# Patient Record
Sex: FEMALE | Race: WHITE | NOT HISPANIC OR LATINO | Employment: UNEMPLOYED | URBAN - METROPOLITAN AREA
[De-identification: names, ages, dates, MRNs, and addresses within clinical notes are randomized per-mention and may not be internally consistent; named-entity substitution may affect disease eponyms.]

---

## 2022-07-19 ENCOUNTER — OFFICE VISIT (OUTPATIENT)
Dept: FAMILY MEDICINE CLINIC | Facility: CLINIC | Age: 47
End: 2022-07-19

## 2022-07-19 VITALS
OXYGEN SATURATION: 97 % | WEIGHT: 174 LBS | DIASTOLIC BLOOD PRESSURE: 82 MMHG | SYSTOLIC BLOOD PRESSURE: 132 MMHG | BODY MASS INDEX: 30.83 KG/M2 | HEART RATE: 79 BPM | HEIGHT: 63 IN

## 2022-07-19 DIAGNOSIS — R09.82 PND (POST-NASAL DRIP): ICD-10-CM

## 2022-07-19 DIAGNOSIS — F17.200 SMOKER: ICD-10-CM

## 2022-07-19 DIAGNOSIS — J02.9 PHARYNGITIS, UNSPECIFIED ETIOLOGY: Primary | ICD-10-CM

## 2022-07-19 DIAGNOSIS — Z53.20 COLON CANCER SCREENING DECLINED: ICD-10-CM

## 2022-07-19 DIAGNOSIS — J04.0 LARYNGITIS: ICD-10-CM

## 2022-07-19 DIAGNOSIS — Z12.31 BREAST CANCER SCREENING BY MAMMOGRAM: ICD-10-CM

## 2022-07-19 PROCEDURE — 99204 OFFICE O/P NEW MOD 45 MIN: CPT | Performed by: NURSE PRACTITIONER

## 2022-07-19 RX ORDER — METHYLPREDNISOLONE 4 MG/1
TABLET ORAL
Qty: 1 EACH | Refills: 0 | Status: SHIPPED | OUTPATIENT
Start: 2022-07-19 | End: 2022-08-31

## 2022-07-19 NOTE — PROGRESS NOTES
Chief Complaint  Follow-up (Seen at [facility] for Bronchitis)    Subjective          Sharon Lagunas presents to [facility] for   History of Present Illness  Patient presents today to establish care.  She is following up from recent ER visit to [place] for bronchitis.  Patient states that she does not feel she had bronchitis, states she went in with a sore throat and cough, states they barely listened to her.  Patient states that she has had this sore throat for quite some time.  Pt has had sore throat for approx 1 month, states that it has improved a bit, but seems to get worse anytime she talks much.  Patient is a smoker, denies any history of acid reflux.  Patient states that when she talks very much her sore throat gets worse and her voice comes and goes.  Patient denies any frequent bouts of laryngitis.  Patient states that she is not short of breath, states they gave her an albuterol inhaler however it made her have a sore throat every time she used it so she has discontinued use.  They prescribed her a Z-Shady which she was able to take, but they also prescribed prednisone tablets, which she states she was unable to take.  Patient states that she has always had difficulty with swallowing pills, states even birth control pills are difficult for her.    Medical History  History reviewed. No pertinent past medical history.  Surgical History  Past Surgical History:   Procedure Laterality Date   • GALLBLADDER SURGERY     • TUBAL ABDOMINAL LIGATION       Social History  Social History     Socioeconomic History   • Marital status:    Tobacco Use   • Smoking status: Current Every Day Smoker     Packs/day: 0.50     Years: 29.00     Pack years: 14.50     Types: Cigarettes   • Smokeless tobacco: Never Used       Current Outpatient Medications:   •  methylPREDNISolone (MEDROL) 4 MG dose pack, Take as directed on package instructions., Disp: 1 each, Rfl: 0    Review of  "Systems     Objective     /82   Pulse 79   Ht 160 cm (63\")   Wt 78.9 kg (174 lb)   SpO2 97%   BMI 30.82 kg/m²     Body mass index is 30.82 kg/m².    Physical Exam  Vitals reviewed.   Constitutional:       Appearance: Normal appearance. She is well-developed.   HENT:      Head: Normocephalic and atraumatic.      Right Ear: External ear normal.      Left Ear: External ear normal.   Eyes:      Conjunctiva/sclera: Conjunctivae normal.      Pupils: Pupils are equal, round, and reactive to light.   Cardiovascular:      Rate and Rhythm: Normal rate and regular rhythm.      Heart sounds: No murmur heard.    No friction rub. No gallop.   Pulmonary:      Effort: Pulmonary effort is normal.      Breath sounds: Normal breath sounds. No wheezing or rhonchi.   Skin:     General: Skin is warm and dry.   Neurological:      Mental Status: She is alert and oriented to person, place, and time.      Cranial Nerves: No cranial nerve deficit.   Psychiatric:         Mood and Affect: Mood and affect normal.         Behavior: Behavior normal.         Thought Content: Thought content normal.         Judgment: Judgment normal.         Result Review :     The following data was reviewed by: MICHELLE Lara on 07/19/2022:        Data reviewed: Recent hospitalization notes Recent ER visit to [place] for cough and sore throat, chest x-ray done in ER                 Assessment:  Diagnoses and all orders for this visit:    1. Pharyngitis, unspecified etiology (Primary)  -     methylPREDNISolone (MEDROL) 4 MG dose pack; Take as directed on package instructions.  Dispense: 1 each; Refill: 0    2. Smoker  Assessment & Plan:  Encouraged smoking cessation, patient verbalized understanding and states she does intend to quit soon.  Patient is intending to donate a kidney to her brother if she is a match, she states she will have to quit smoking 1 month prior to the surgery per the guidelines, she intends to quit at that time.  States " she has decreased her amount of smoking recently down to a half a pack a day.      3. Laryngitis  Assessment & Plan:  Discussed with patient that if her sore throat and loss of voice does not improve after treatment with the Medrol Dosepak she needs to follow-up so we can refer her to ENT for further evaluation given her history of smoking    Orders:  -     methylPREDNISolone (MEDROL) 4 MG dose pack; Take as directed on package instructions.  Dispense: 1 each; Refill: 0    4. Breast cancer screening by mammogram  -     Mammo Screening Digital Tomosynthesis Bilateral With CAD; Future    5. Colon cancer screening declined  Comments:  Patient declines to schedule colonoscopy at present, states she may reconsider at a later date    6. PND (post-nasal drip)  -     methylPREDNISolone (MEDROL) 4 MG dose pack; Take as directed on package instructions.  Dispense: 1 each; Refill: 0              Follow Up     Return if symptoms worsen or fail to improve.    Patient was given instructions and counseling regarding her condition or for health maintenance advice. Please see specific information pulled into the AVS if appropriate.     Trinidad Salmeron, APRN  07/19/2022

## 2022-07-19 NOTE — ASSESSMENT & PLAN NOTE
Discussed with patient that if her sore throat and loss of voice does not improve after treatment with the Medrol Dosepak she needs to follow-up so we can refer her to ENT for further evaluation given her history of smoking

## 2022-07-19 NOTE — ASSESSMENT & PLAN NOTE
Encouraged smoking cessation, patient verbalized understanding and states she does intend to quit soon.  Patient is intending to donate a kidney to her brother if she is a match, she states she will have to quit smoking 1 month prior to the surgery per the guidelines, she intends to quit at that time.  States she has decreased her amount of smoking recently down to a half a pack a day.

## 2022-08-31 ENCOUNTER — TELEPHONE (OUTPATIENT)
Dept: FAMILY MEDICINE CLINIC | Facility: CLINIC | Age: 47
End: 2022-08-31

## 2022-08-31 ENCOUNTER — OFFICE VISIT (OUTPATIENT)
Dept: FAMILY MEDICINE CLINIC | Facility: CLINIC | Age: 47
End: 2022-08-31

## 2022-08-31 VITALS
DIASTOLIC BLOOD PRESSURE: 82 MMHG | BODY MASS INDEX: 32 KG/M2 | OXYGEN SATURATION: 98 % | SYSTOLIC BLOOD PRESSURE: 134 MMHG | HEART RATE: 80 BPM | HEIGHT: 63 IN | WEIGHT: 180.6 LBS

## 2022-08-31 DIAGNOSIS — F17.200 SMOKER: Primary | ICD-10-CM

## 2022-08-31 PROCEDURE — 99213 OFFICE O/P EST LOW 20 MIN: CPT | Performed by: NURSE PRACTITIONER

## 2022-08-31 NOTE — PROGRESS NOTES
"Chief Complaint  Kidney donation (Patient is trying to donate a kidney to her brother and needs paperwork checked and signed )    SUBJECTIVE  Sharon Lagunas presents to [facility]   Patient presents today to discuss potential kidney donation to her brother.  Patient is wanting her paperwork faxed.  Patient admits that she is still smoking, states that she is unsure if she will have to quit for the surgery, states that she is also not sure if she is a match yet or not.  Patient would like to have Pap smear done here if the transplant team is okay with this, she wishes to wait to speak with her coordinator prior to scheduling.    History of Present Illness  History reviewed. No pertinent past medical history.   Family History   Problem Relation Age of Onset   • Cancer Father    • Hypertension Father    • Diabetes Father    • Diabetes Brother    • Hypertension Brother    • Kidney disease Brother       Past Surgical History:   Procedure Laterality Date   • GALLBLADDER SURGERY     • TUBAL ABDOMINAL LIGATION        No current outpatient medications on file.    OBJECTIVE  Vital Signs:   /82   Pulse 80   Ht 160 cm (63\")   Wt 81.9 kg (180 lb 9.6 oz)   LMP 08/16/2022 (Approximate)   SpO2 98%   BMI 31.99 kg/m²    Estimated body mass index is 31.99 kg/m² as calculated from the following:    Height as of this encounter: 160 cm (63\").    Weight as of this encounter: 81.9 kg (180 lb 9.6 oz).     Wt Readings from Last 3 Encounters:   08/31/22 81.9 kg (180 lb 9.6 oz)   07/19/22 78.9 kg (174 lb)     BP Readings from Last 3 Encounters:   08/31/22 134/82   07/19/22 132/82       Physical Exam  Vitals reviewed.   Constitutional:       Appearance: Normal appearance. She is well-developed.   HENT:      Head: Normocephalic and atraumatic.      Right Ear: External ear normal.      Left Ear: External ear normal.   Eyes:      Conjunctiva/sclera: Conjunctivae normal.      Pupils: Pupils are equal, " round, and reactive to light.   Cardiovascular:      Rate and Rhythm: Normal rate and regular rhythm.      Heart sounds: No murmur heard.    No friction rub. No gallop.   Pulmonary:      Effort: Pulmonary effort is normal.      Breath sounds: Normal breath sounds. No wheezing or rhonchi.   Abdominal:      Tenderness: There is no abdominal tenderness.   Skin:     General: Skin is warm and dry.   Neurological:      Mental Status: She is alert and oriented to person, place, and time.      Cranial Nerves: No cranial nerve deficit.   Psychiatric:         Mood and Affect: Mood and affect normal.         Behavior: Behavior normal.         Thought Content: Thought content normal.         Judgment: Judgment normal.          Result Review              XR Chest 1 View    Result Date: 6/29/2022  Normal x-ray examination of the chest. Electronically Signed: Leoncio Dasilva MD 2022/06/29 at 12:04 CDT Reading Location ID and State: [id number] / [place] Tel [phone], Service support  [phone], Fax [phone]       The above data has been reviewed by MICHELLE Lara 08/31/2022 07:48 EDT.          Patient Care Team:  Trinidad Salmeron APRN as PCP - General (Nurse Practitioner)            ASSESSMENT & PLAN    Diagnoses and all orders for this visit:    1. Smoker (Primary)  Assessment & Plan:  Pt states she is smoking about a 1/2ppd currently, discussed that I highly recommend she quit prior to transplant surgery if she goes through with it, is planning to try to quit prior to the surgery, still does not know if she is a match, declines smoking cessation medication at this moment         Tobacco Use: High Risk   • Smoking Tobacco Use: Current Every Day Smoker   • Smokeless Tobacco Use: Never Used     We will fax the patient's paperwork that she has brought as requested, however it does not appear that it is anything that requires signature from primary care.    Follow Up     Return if symptoms worsen or fail to  improve.        Patient was given instructions and counseling regarding her condition or for health maintenance advice. Please see specific information pulled into the AVS if appropriate.   I have reviewed information obtained and documented by others and I have confirmed the accuracy of this documented note.    MICHELLE Lara

## 2022-08-31 NOTE — TELEPHONE ENCOUNTER
Caller: Sharon Lagunas    Relationship: Self    Best call back number: [phone]     What specialty or service is being requested: COLONOSCOPY

## 2022-08-31 NOTE — ASSESSMENT & PLAN NOTE
Pt states she is smoking about a 1/2ppd currently, discussed that I highly recommend she quit prior to transplant surgery if she goes through with it, is planning to try to quit prior to the surgery, still does not know if she is a match, declines smoking cessation medication at this moment

## 2022-09-19 ENCOUNTER — OFFICE VISIT (OUTPATIENT)
Dept: FAMILY MEDICINE CLINIC | Facility: CLINIC | Age: 47
End: 2022-09-19

## 2022-09-19 ENCOUNTER — LAB (OUTPATIENT)
Dept: LAB | Facility: HOSPITAL | Age: 47
End: 2022-09-19

## 2022-09-19 VITALS
HEART RATE: 75 BPM | OXYGEN SATURATION: 97 % | BODY MASS INDEX: 31.93 KG/M2 | HEIGHT: 63 IN | WEIGHT: 180.2 LBS | DIASTOLIC BLOOD PRESSURE: 90 MMHG | SYSTOLIC BLOOD PRESSURE: 140 MMHG

## 2022-09-19 DIAGNOSIS — Z13.220 SCREENING FOR LIPID DISORDERS: ICD-10-CM

## 2022-09-19 DIAGNOSIS — Z13.29 SCREENING FOR THYROID DISORDER: Primary | ICD-10-CM

## 2022-09-19 DIAGNOSIS — Z12.11 COLON CANCER SCREENING: ICD-10-CM

## 2022-09-19 DIAGNOSIS — R79.89 ABNORMAL TSH: ICD-10-CM

## 2022-09-19 DIAGNOSIS — Z13.29 SCREENING FOR THYROID DISORDER: ICD-10-CM

## 2022-09-19 DIAGNOSIS — Z01.89 ROUTINE LAB DRAW: ICD-10-CM

## 2022-09-19 DIAGNOSIS — R73.09 ELEVATED GLUCOSE: ICD-10-CM

## 2022-09-19 DIAGNOSIS — Z01.419 WELL WOMAN EXAM WITH ROUTINE GYNECOLOGICAL EXAM: ICD-10-CM

## 2022-09-19 LAB
ALBUMIN SERPL-MCNC: 4.4 G/DL (ref 3.5–5.2)
ALBUMIN/GLOB SERPL: 1.4 G/DL
ALP SERPL-CCNC: 87 U/L (ref 39–117)
ALT SERPL W P-5'-P-CCNC: 19 U/L (ref 1–33)
ANION GAP SERPL CALCULATED.3IONS-SCNC: 10.3 MMOL/L (ref 5–15)
AST SERPL-CCNC: 22 U/L (ref 1–32)
BASOPHILS # BLD AUTO: 0.06 10*3/MM3 (ref 0–0.2)
BASOPHILS NFR BLD AUTO: 0.9 % (ref 0–1.5)
BILIRUB SERPL-MCNC: <0.2 MG/DL (ref 0–1.2)
BUN SERPL-MCNC: 13 MG/DL (ref 6–20)
BUN/CREAT SERPL: 14.8 (ref 7–25)
CALCIUM SPEC-SCNC: 9.6 MG/DL (ref 8.6–10.5)
CHLORIDE SERPL-SCNC: 105 MMOL/L (ref 98–107)
CHOLEST SERPL-MCNC: 191 MG/DL (ref 0–200)
CO2 SERPL-SCNC: 24.7 MMOL/L (ref 22–29)
CREAT SERPL-MCNC: 0.88 MG/DL (ref 0.57–1)
DEPRECATED RDW RBC AUTO: 42.5 FL (ref 37–54)
EGFRCR SERPLBLD CKD-EPI 2021: 82.7 ML/MIN/1.73
EOSINOPHIL # BLD AUTO: 0.06 10*3/MM3 (ref 0–0.4)
EOSINOPHIL NFR BLD AUTO: 0.9 % (ref 0.3–6.2)
ERYTHROCYTE [DISTWIDTH] IN BLOOD BY AUTOMATED COUNT: 13.6 % (ref 12.3–15.4)
GLOBULIN UR ELPH-MCNC: 3.1 GM/DL
GLUCOSE SERPL-MCNC: 105 MG/DL (ref 65–99)
HCT VFR BLD AUTO: 43 % (ref 34–46.6)
HDLC SERPL-MCNC: 42 MG/DL (ref 40–60)
HGB BLD-MCNC: 14.4 G/DL (ref 12–15.9)
IMM GRANULOCYTES # BLD AUTO: 0.03 10*3/MM3 (ref 0–0.05)
IMM GRANULOCYTES NFR BLD AUTO: 0.4 % (ref 0–0.5)
LDLC SERPL CALC-MCNC: 127 MG/DL (ref 0–100)
LDLC/HDLC SERPL: 2.97 {RATIO}
LYMPHOCYTES # BLD AUTO: 2.59 10*3/MM3 (ref 0.7–3.1)
LYMPHOCYTES NFR BLD AUTO: 38.4 % (ref 19.6–45.3)
MCH RBC QN AUTO: 28.7 PG (ref 26.6–33)
MCHC RBC AUTO-ENTMCNC: 33.5 G/DL (ref 31.5–35.7)
MCV RBC AUTO: 85.7 FL (ref 79–97)
MONOCYTES # BLD AUTO: 0.41 10*3/MM3 (ref 0.1–0.9)
MONOCYTES NFR BLD AUTO: 6.1 % (ref 5–12)
NEUTROPHILS NFR BLD AUTO: 3.6 10*3/MM3 (ref 1.7–7)
NEUTROPHILS NFR BLD AUTO: 53.3 % (ref 42.7–76)
NRBC BLD AUTO-RTO: 0 /100 WBC (ref 0–0.2)
PLATELET # BLD AUTO: 417 10*3/MM3 (ref 140–450)
PMV BLD AUTO: 9.9 FL (ref 6–12)
POTASSIUM SERPL-SCNC: 5 MMOL/L (ref 3.5–5.2)
PROT SERPL-MCNC: 7.5 G/DL (ref 6–8.5)
RBC # BLD AUTO: 5.02 10*6/MM3 (ref 3.77–5.28)
SODIUM SERPL-SCNC: 140 MMOL/L (ref 136–145)
TRIGL SERPL-MCNC: 121 MG/DL (ref 0–150)
TSH SERPL DL<=0.05 MIU/L-ACNC: 5.44 UIU/ML (ref 0.27–4.2)
VLDLC SERPL-MCNC: 22 MG/DL (ref 5–40)
WBC NRBC COR # BLD: 6.75 10*3/MM3 (ref 3.4–10.8)

## 2022-09-19 PROCEDURE — 80061 LIPID PANEL: CPT

## 2022-09-19 PROCEDURE — 36415 COLL VENOUS BLD VENIPUNCTURE: CPT

## 2022-09-19 PROCEDURE — 99396 PREV VISIT EST AGE 40-64: CPT | Performed by: NURSE PRACTITIONER

## 2022-09-19 PROCEDURE — 87624 HPV HI-RISK TYP POOLED RSLT: CPT | Performed by: NURSE PRACTITIONER

## 2022-09-19 PROCEDURE — 83036 HEMOGLOBIN GLYCOSYLATED A1C: CPT

## 2022-09-19 PROCEDURE — 84439 ASSAY OF FREE THYROXINE: CPT

## 2022-09-19 PROCEDURE — 80050 GENERAL HEALTH PANEL: CPT

## 2022-09-19 PROCEDURE — G0123 SCREEN CERV/VAG THIN LAYER: HCPCS | Performed by: NURSE PRACTITIONER

## 2022-09-19 NOTE — PROGRESS NOTES
"Subjective   History of Present Illness    Sharon Lagunas is a 47 y.o. female who presents for annual exam.    Obstetric History:  OB History    No obstetric history on file.        Menstrual History:  Menarche Age: 11 years  Patient's last menstrual period was 09/11/2022 (approximate).  Period Pattern: (!) Irregular  Menstrual Flow: Heavy  Menstrual Control: Maxi pad, Tampon  Dysmenorrhea: None    Sexual History:         No results found for: HPVAPTIMA    Current contraception: tubal  History of abnormal Pap smear: YES  PJ exposure in utero: NO  Received Gardasil immunization: NO  Perform regular self breast exam: YES  Family history of uterine or ovarian cancer: NO  Family History of cervical cancer: YES COUSIN  Family History of colon cancer/colon polyps: YES  Regular self breast exam: YES  History of abnormal mammogram: NO  Family history of breast cancer: NO  History of abnormal lipids: NO    The following portions of the patient's history were reviewed and updated as appropriate: allergies, current medications, past family history, past medical history, past social history, past surgical history and problem list.    Review of Systems    A comprehensive review of systems was negative.     Objective   Physical Exam    /90 (BP Location: Left arm)   Pulse 75   Ht 160 cm (63\")   Wt 81.7 kg (180 lb 3.2 oz)   LMP 09/11/2022 (Approximate)   SpO2 97%   BMI 31.92 kg/m²     General:   alert, appears stated age and cooperative   Heart: regular rate and rhythm, S1, S2 normal, no murmur, click, rub or gallop   Lungs: clear to auscultation bilaterally   Abdomen: soft, non-tender, without masses or organomegaly   Breast: inspection negative, no nipple discharge or bleeding, no masses or nodularity palpable   Vulva: normal   Vagina: normal mucosa, normal discharge   Cervix: no cervical motion tenderness and no lesions   Uterus: non-tender   Adnexa: no mass, fullness, tenderness     Assessment & Plan   Diagnoses and " all orders for this visit:    1. Screening for thyroid disorder (Primary)  -     TSH; Future    2. Routine lab draw  -     Comprehensive Metabolic Panel; Future  -     CBC & Differential; Future    3. Screening for lipid disorders  -     Lipid Panel; Future    4. Colon cancer screening  -     Ambulatory Referral For Screening Colonoscopy    5. Well woman exam with routine gynecological exam  -     IgP, Aptima HPV; Future  -     IgP, Aptima HPV      The patient is advised to quit smoking, attempt to lose weight and return for routine annual checkups.    All questions answered.  Await pap smear results.  Blood tests: CBC with diff, Comprehensive metabolic panel, Total cholesterol and TSH.  Thin prep Pap smear.

## 2022-09-20 DIAGNOSIS — R79.89 ABNORMAL TSH: ICD-10-CM

## 2022-09-20 DIAGNOSIS — R73.09 ELEVATED GLUCOSE: Primary | ICD-10-CM

## 2022-09-20 LAB
HBA1C MFR BLD: 5.5 % (ref 4.8–5.6)
T4 FREE SERPL-MCNC: 1.02 NG/DL (ref 0.93–1.7)

## 2022-09-21 DIAGNOSIS — E03.9 HYPOTHYROIDISM, UNSPECIFIED TYPE: Primary | ICD-10-CM

## 2022-09-21 RX ORDER — LEVOTHYROXINE SODIUM 0.03 MG/1
25 TABLET ORAL DAILY
Qty: 30 TABLET | Refills: 2 | Status: ON HOLD | OUTPATIENT
Start: 2022-09-21 | End: 2022-11-14 | Stop reason: SDUPTHER

## 2022-09-22 ENCOUNTER — HOSPITAL ENCOUNTER (OUTPATIENT)
Dept: MAMMOGRAPHY | Facility: HOSPITAL | Age: 47
Discharge: HOME | End: 2022-09-22
Admitting: NURSE PRACTITIONER

## 2022-09-22 DIAGNOSIS — Z12.31 BREAST CANCER SCREENING BY MAMMOGRAM: ICD-10-CM

## 2022-09-22 LAB
CYTOLOGIST CVX/VAG CYTO: NORMAL
CYTOLOGY CVX/VAG DOC CYTO: NORMAL
CYTOLOGY CVX/VAG DOC THIN PREP: NORMAL
DX ICD CODE: NORMAL
HIV 1 & 2 AB SER-IMP: NORMAL
HPV I/H RISK 4 DNA CVX QL PROBE+SIG AMP: NEGATIVE
OTHER STN SPEC: NORMAL
STAT OF ADQ CVX/VAG CYTO-IMP: NORMAL

## 2022-09-22 PROCEDURE — 77063 BREAST TOMOSYNTHESIS BI: CPT

## 2022-09-22 PROCEDURE — 77067 SCR MAMMO BI INCL CAD: CPT

## 2022-09-30 ENCOUNTER — TELEPHONE (OUTPATIENT)
Dept: FAMILY MEDICINE CLINIC | Facility: CLINIC | Age: 47
End: 2022-09-30

## 2022-09-30 NOTE — TELEPHONE ENCOUNTER
Caller: Sharon Lagunas    Relationship: Self    Best call back number: [phone]    What specialty or service is being requested: COLONOSCOPY    What is the provider, practice or medical service name: [facility]    What is the office location: [facility] [place], [place]     What is the office phone number: [phone]    Any additional details:   PATIENT CALLED TO LET US KNOW THAT SHE FOUND A GASTROENTEROLOGY DOCTOR THAT COULD POSSIBLY DO COLONOSCOPY FOR HER SOONER THAN 10/20/2022 WITH Cathy GARZON. SHE JUST NEEDS TO KNOW IF EXISTING REFERRAL CAN BE USED FOR THAT, OR DOES IT NEED TO BE A NEW ONE SINCE IT IS ACROSS STATE LINES. SHE IS REQUESTING CALL BACK.

## 2022-10-20 ENCOUNTER — PREP FOR SURGERY (OUTPATIENT)
Dept: OTHER | Facility: HOSPITAL | Age: 47
End: 2022-10-20

## 2022-10-20 ENCOUNTER — OFFICE VISIT (OUTPATIENT)
Dept: SURGERY | Facility: CLINIC | Age: 47
End: 2022-10-20

## 2022-10-20 VITALS — HEART RATE: 87 BPM | BODY MASS INDEX: 32.07 KG/M2 | WEIGHT: 181 LBS | HEIGHT: 63 IN

## 2022-10-20 DIAGNOSIS — Z12.11 SCREENING FOR COLON CANCER: Primary | ICD-10-CM

## 2022-10-20 PROCEDURE — S0285 CNSLT BEFORE SCREEN COLONOSC: HCPCS | Performed by: NURSE PRACTITIONER

## 2022-10-20 RX ORDER — POLYETHYLENE GLYCOL 3350 17 G/17G
POWDER, FOR SOLUTION ORAL
Qty: 238 PACKET | Refills: 0 | Status: SHIPPED | OUTPATIENT
Start: 2022-10-20

## 2022-10-20 NOTE — PROGRESS NOTES
Chief Complaint: Colonoscopy (consult)    Subjective    Colonoscopy consultation         History of Present Illness  Sharon Lagunas is a 47 y.o. female presents to [facility] for colonoscopy consultation.    Patient presents today on referral from Nadya Mancini for colonoscopy consultation.    Patient reports that she is trying to donate a kidney to her brother, and needs a colonoscopy for clearance for donation.    She denies any abdominal pain, change in bowel habit, or rectal bleeding.    Denies any family history of colorectal cancer.    No previous colonoscopy    Objective     Past Medical History:   Diagnosis Date   • Hypothyroid        Past Surgical History:   Procedure Laterality Date   • GALLBLADDER SURGERY     • TUBAL ABDOMINAL LIGATION         Outpatient Medications Marked as Taking for the 10/20/22 encounter (Office Visit) with Cathy Mahmood, MICHELLE   Medication Sig Dispense Refill   • levothyroxine (SYNTHROID, LEVOTHROID) 25 MCG tablet Take 1 tablet by mouth Daily. 30 tablet 2       Allergies   Allergen Reactions   • Penicillins Hives and Nausea And Vomiting        Family History   Problem Relation Age of Onset   • Cancer Father    • Hypertension Father    • Diabetes Father    • Diabetes Brother    • Hypertension Brother    • Kidney disease Brother        Social History     Socioeconomic History   • Marital status:    Tobacco Use   • Smoking status: Every Day     Packs/day: 0.50     Years: 29.00     Pack years: 14.50     Types: Cigarettes   • Smokeless tobacco: Never   Vaping Use   • Vaping Use: Never used   Substance and Sexual Activity   • Alcohol use: Yes     Comment: rare   • Drug use: Never   • Sexual activity: Defer       Review of Systems   Constitutional: Negative for chills and fever.   Gastrointestinal: Negative for abdominal distention, abdominal pain, anal bleeding, blood in stool, constipation, diarrhea and rectal pain.        Vital Signs:   Pulse  "87   Ht 160 cm (63\")   Wt 82.1 kg (181 lb)   BMI 32.06 kg/m²      Physical Exam  Vitals and nursing note reviewed.   Constitutional:       General: She is not in acute distress.     Appearance: She is obese.   HENT:      Head: Normocephalic.   Cardiovascular:      Rate and Rhythm: Normal rate.   Pulmonary:      Effort: Pulmonary effort is normal.      Breath sounds: No stridor. No wheezing.   Abdominal:      Palpations: Abdomen is soft.      Tenderness: There is no guarding.   Musculoskeletal:         General: No deformity. Normal range of motion.   Skin:     General: Skin is warm and dry.      Coloration: Skin is not jaundiced.   Neurological:      General: No focal deficit present.      Mental Status: She is alert and oriented to person, place, and time.   Psychiatric:         Mood and Affect: Mood normal.         Thought Content: Thought content normal.          Result Review :          []  Laboratory  []  Radiology  []  Pathology  []  Microbiology  []  EKG/Telemetry   []  Cardiology/Vascular   [x]  Old records  Today I have reviewed Nadya Mancini's previous office note.     Assessment and Plan    Diagnoses and all orders for this visit:    1. Screening for colon cancer (Primary)    Other orders  -     polyethylene glycol (MIRALAX) 17 g packet; Take as directed.  Instructions given in office.  Dispense: 238 g bottle  Dispense: 238 packet; Refill: 0        Follow Up   Return for Scheduled colonoscopy with Dr. Jasmine on 11/14/2022 at [facility].     Hospital arrival time:12:30.     Possible risks/complications, benefits, and alternatives to surgical or invasive procedure have been explained to patient and/or legal guardian.     Patient has been evaluated and can tolerate anesthesia and/or sedation. Risks, benefits, and alternatives to anesthesia and sedation have been explained to patient and/or legal guardian.  Patient verbalizes understanding and is will proceed with above plan. "     Patient was given instructions and counseling regarding her condition or for health maintenance advice. Please see specific information pulled into the AVS if appropriate.     The office note was dictated with the help of the dragon dictation system.

## 2022-11-08 ENCOUNTER — TELEPHONE (OUTPATIENT)
Dept: FAMILY MEDICINE CLINIC | Facility: CLINIC | Age: 47
End: 2022-11-08

## 2022-11-14 ENCOUNTER — HOSPITAL ENCOUNTER (OUTPATIENT)
Facility: HOSPITAL | Age: 47
Setting detail: HOSPITAL OUTPATIENT SURGERY
Discharge: HOME | End: 2022-11-14
Attending: SURGERY | Admitting: SURGERY

## 2022-11-14 ENCOUNTER — LAB (OUTPATIENT)
Dept: LAB | Facility: HOSPITAL | Age: 47
End: 2022-11-14

## 2022-11-14 ENCOUNTER — ANESTHESIA EVENT (OUTPATIENT)
Dept: GASTROENTEROLOGY | Facility: HOSPITAL | Age: 47
End: 2022-11-14

## 2022-11-14 ENCOUNTER — ANESTHESIA (OUTPATIENT)
Dept: GASTROENTEROLOGY | Facility: HOSPITAL | Age: 47
End: 2022-11-14

## 2022-11-14 VITALS
BODY MASS INDEX: 30.78 KG/M2 | HEART RATE: 72 BPM | WEIGHT: 173.72 LBS | OXYGEN SATURATION: 98 % | SYSTOLIC BLOOD PRESSURE: 137 MMHG | RESPIRATION RATE: 16 BRPM | DIASTOLIC BLOOD PRESSURE: 83 MMHG | TEMPERATURE: 98.2 F | HEIGHT: 63 IN

## 2022-11-14 DIAGNOSIS — E03.9 HYPOTHYROIDISM, UNSPECIFIED TYPE: ICD-10-CM

## 2022-11-14 DIAGNOSIS — E03.9 HYPOTHYROIDISM, UNSPECIFIED TYPE: Primary | ICD-10-CM

## 2022-11-14 LAB
T4 FREE SERPL-MCNC: 1.07 NG/DL (ref 0.93–1.7)
TSH SERPL DL<=0.05 MIU/L-ACNC: 8.43 UIU/ML (ref 0.27–4.2)

## 2022-11-14 PROCEDURE — 25010000002 PROPOFOL 10 MG/ML EMULSION: Performed by: NURSE ANESTHETIST, CERTIFIED REGISTERED

## 2022-11-14 PROCEDURE — 36415 COLL VENOUS BLD VENIPUNCTURE: CPT

## 2022-11-14 PROCEDURE — 84439 ASSAY OF FREE THYROXINE: CPT

## 2022-11-14 PROCEDURE — 84443 ASSAY THYROID STIM HORMONE: CPT

## 2022-11-14 RX ORDER — LEVOTHYROXINE SODIUM 0.05 MG/1
50 TABLET ORAL DAILY
Qty: 30 TABLET | Refills: 2 | Status: SHIPPED | OUTPATIENT
Start: 2022-11-14 | End: 2023-01-09 | Stop reason: DRUGHIGH

## 2022-11-14 RX ORDER — SODIUM CHLORIDE, SODIUM LACTATE, POTASSIUM CHLORIDE, CALCIUM CHLORIDE 600; 310; 30; 20 MG/100ML; MG/100ML; MG/100ML; MG/100ML
30 INJECTION, SOLUTION INTRAVENOUS CONTINUOUS
Status: DISCONTINUED | OUTPATIENT
Start: 2022-11-14 | End: 2022-11-14 | Stop reason: HOSPADM

## 2022-11-14 RX ORDER — LIDOCAINE HYDROCHLORIDE 20 MG/ML
INJECTION, SOLUTION EPIDURAL; INFILTRATION; INTRACAUDAL; PERINEURAL AS NEEDED
Status: DISCONTINUED | OUTPATIENT
Start: 2022-11-14 | End: 2022-11-14 | Stop reason: SURG

## 2022-11-14 RX ADMIN — LIDOCAINE HYDROCHLORIDE 30 MG: 20 INJECTION, SOLUTION EPIDURAL; INFILTRATION; INTRACAUDAL; PERINEURAL at 14:31

## 2022-11-14 RX ADMIN — SODIUM CHLORIDE, POTASSIUM CHLORIDE, SODIUM LACTATE AND CALCIUM CHLORIDE 30 ML/HR: 600; 310; 30; 20 INJECTION, SOLUTION INTRAVENOUS at 13:17

## 2022-11-14 RX ADMIN — PROPOFOL 120 MCG/KG/MIN: 10 INJECTION, EMULSION INTRAVENOUS at 14:31

## 2022-11-14 NOTE — ANESTHESIA POSTPROCEDURE EVALUATION
Patient: Sharon Lagunas    Procedure Summary     Date: 11/14/22 Room / Location: [facility] 1 / [facility]    Anesthesia Start: 1429 Anesthesia Stop: 1451    Procedure: COLONOSCOPY Diagnosis:       Screening for colon cancer      (Screening for colon cancer [Z12.11])    Surgeons: Darrin Jasmine MD Provider: Alexx Srinivasan MD    Anesthesia Type: general ASA Status: 2          Anesthesia Type: general    Vitals  Vitals Value Taken Time   /87 11/14/22 1501   Temp 36.6 °C (97.8 °F) 11/14/22 1450   Pulse 72 11/14/22 1505   Resp 24 11/14/22 1500   SpO2 98 % 11/14/22 1505           Post Anesthesia Care and Evaluation    Patient location during evaluation: bedside  Patient participation: complete - patient participated  Level of consciousness: awake  Pain management: adequate    Airway patency: patent  Anesthetic complications: No anesthetic complications  PONV Status: none  Cardiovascular status: acceptable and stable  Respiratory status: acceptable  Hydration status: acceptable    Comments: An Anesthesiologist personally participated in the most demanding procedures (including induction and emergence if applicable) in the anesthesia plan, monitored the course of anesthesia administration at frequent intervals and remained physically present and available for immediate diagnosis and treatment of emergencies.

## 2022-11-14 NOTE — ANESTHESIA PREPROCEDURE EVALUATION
Anesthesia Evaluation     Patient summary reviewed and Nursing notes reviewed   no history of anesthetic complications:  NPO Solid Status: > 8 hours  NPO Liquid Status: > 2 hours           Airway   Mallampati: II  TM distance: >3 FB  Neck ROM: full  No difficulty expected  Dental    (+) edentulous    Pulmonary - negative pulmonary ROS and normal exam    breath sounds clear to auscultation  Cardiovascular - negative cardio ROS and normal exam  Exercise tolerance: good (4-7 METS)    Rhythm: regular  Rate: normal        Neuro/Psych- negative ROS  GI/Hepatic/Renal/Endo    (+)   thyroid problem hypothyroidism    Musculoskeletal (-) negative ROS    Abdominal    Substance History - negative use     OB/GYN negative ob/gyn ROS         Other - negative ROS       ROS/Med Hx Other: PAT Nursing Notes unavailable.                   Anesthesia Plan    ASA 2     general     (Total IV Anesthesia    Patient understands anesthesia not responsible for dental damage.  )  intravenous induction     Anesthetic plan, risks, benefits, and alternatives have been provided, discussed and informed consent has been obtained with: patient.    Plan discussed with CRNA.        CODE STATUS:

## 2023-01-05 ENCOUNTER — TELEPHONE (OUTPATIENT)
Dept: FAMILY MEDICINE CLINIC | Facility: CLINIC | Age: 48
End: 2023-01-05
Payer: COMMERCIAL

## 2023-01-09 ENCOUNTER — LAB (OUTPATIENT)
Dept: LAB | Facility: HOSPITAL | Age: 48
End: 2023-01-09
Payer: COMMERCIAL

## 2023-01-09 DIAGNOSIS — E03.9 HYPOTHYROIDISM, UNSPECIFIED TYPE: ICD-10-CM

## 2023-01-09 DIAGNOSIS — E03.9 HYPOTHYROIDISM, UNSPECIFIED TYPE: Primary | ICD-10-CM

## 2023-01-09 LAB
T4 FREE SERPL-MCNC: 0.96 NG/DL (ref 0.93–1.7)
TSH SERPL DL<=0.05 MIU/L-ACNC: 6.82 UIU/ML (ref 0.27–4.2)

## 2023-01-09 PROCEDURE — 36415 COLL VENOUS BLD VENIPUNCTURE: CPT

## 2023-01-09 PROCEDURE — 84443 ASSAY THYROID STIM HORMONE: CPT

## 2023-01-09 PROCEDURE — 84439 ASSAY OF FREE THYROXINE: CPT

## 2023-01-09 RX ORDER — LEVOTHYROXINE SODIUM 0.07 MG/1
75 TABLET ORAL DAILY
Qty: 30 TABLET | Refills: 2 | Status: SHIPPED | OUTPATIENT
Start: 2023-01-09

## 2023-02-08 ENCOUNTER — LAB (OUTPATIENT)
Dept: LAB | Facility: HOSPITAL | Age: 48
End: 2023-02-08
Payer: COMMERCIAL

## 2023-02-08 DIAGNOSIS — E03.9 HYPOTHYROIDISM, UNSPECIFIED TYPE: ICD-10-CM

## 2023-02-08 LAB
T4 FREE SERPL-MCNC: 1.09 NG/DL (ref 0.93–1.7)
TSH SERPL DL<=0.05 MIU/L-ACNC: 8.52 UIU/ML (ref 0.27–4.2)

## 2023-02-08 PROCEDURE — 84439 ASSAY OF FREE THYROXINE: CPT

## 2023-02-08 PROCEDURE — 36415 COLL VENOUS BLD VENIPUNCTURE: CPT

## 2023-02-08 PROCEDURE — 84443 ASSAY THYROID STIM HORMONE: CPT

## 2023-07-31 ENCOUNTER — TELEPHONE (OUTPATIENT)
Dept: FAMILY MEDICINE CLINIC | Facility: CLINIC | Age: 48
End: 2023-07-31
Payer: COMMERCIAL

## 2023-08-14 ENCOUNTER — TELEPHONE (OUTPATIENT)
Dept: FAMILY MEDICINE CLINIC | Facility: CLINIC | Age: 48
End: 2023-08-14
Payer: COMMERCIAL

## 2023-08-14 NOTE — TELEPHONE ENCOUNTER
Caller: Bebo Sharon    Relationship: Self    Best call back number: [phone]     What is the best time to reach you: ANY    Who are you requesting to speak with (clinical staff, provider,  specific staff member): CLINICAL    What was the call regarding: PATIENT CALLED TO REPORT SHE DOES NOT HAVE HER BLOOD PRESSURE READINGS BACK YET AND WOULD LIKE TO KNOW IF SHE STILL NEEDS HER APPOINTMENT ON 8.16.2023. PLEASE ADVISE.

## 2023-08-15 ENCOUNTER — LAB (OUTPATIENT)
Dept: LAB | Facility: HOSPITAL | Age: 48
End: 2023-08-15
Payer: COMMERCIAL

## 2023-08-15 ENCOUNTER — TRANSCRIBE ORDERS (OUTPATIENT)
Dept: ADMINISTRATIVE | Facility: HOSPITAL | Age: 48
End: 2023-08-15
Payer: COMMERCIAL

## 2023-08-15 DIAGNOSIS — Z52.4 KIDNEY DONOR: Primary | ICD-10-CM

## 2023-08-15 DIAGNOSIS — Z52.4 KIDNEY DONOR: ICD-10-CM

## 2023-08-15 LAB
BACTERIA UR QL AUTO: ABNORMAL /HPF
BILIRUB UR QL STRIP: NEGATIVE
CLARITY UR: CLEAR
COLOR UR: YELLOW
GLUCOSE UR STRIP-MCNC: NEGATIVE MG/DL
HGB UR QL STRIP.AUTO: ABNORMAL
HYALINE CASTS UR QL AUTO: ABNORMAL /LPF
KETONES UR QL STRIP: NEGATIVE
LEUKOCYTE ESTERASE UR QL STRIP.AUTO: ABNORMAL
NITRITE UR QL STRIP: NEGATIVE
PH UR STRIP.AUTO: 6.5 [PH] (ref 5–8)
PROT UR QL STRIP: NEGATIVE
RBC # UR STRIP: ABNORMAL /HPF
REF LAB TEST METHOD: ABNORMAL
SP GR UR STRIP: 1.01 (ref 1–1.03)
SQUAMOUS #/AREA URNS HPF: ABNORMAL /HPF
UROBILINOGEN UR QL STRIP: ABNORMAL
WBC # UR STRIP: ABNORMAL /HPF

## 2023-08-15 PROCEDURE — 81001 URINALYSIS AUTO W/SCOPE: CPT

## 2023-08-18 ENCOUNTER — TELEPHONE (OUTPATIENT)
Dept: FAMILY MEDICINE CLINIC | Facility: CLINIC | Age: 48
End: 2023-08-18

## 2023-08-18 NOTE — TELEPHONE ENCOUNTER
Caller: Sharon Lagunas    Relationship: Self    Best call back number: [phone]    What is the best time to reach you: ANY    Who are you requesting to speak with (clinical staff, provider,  specific staff member): CLINICAL    What was the call regarding: PATIENT HAD URINALYSIS WITH BLOOD IN URINE SHE WOULD LIKE TO KNOW IS MICHELLE BARNES WANTS TO SCHEDULE A FOLLOW UP APPOINTMENT

## 2023-08-21 NOTE — TELEPHONE ENCOUNTER
Not sure of the circumstances surrounding this urine, but if patient would like to follow-up, please schedule an appointment

## 2023-08-23 ENCOUNTER — TRANSCRIBE ORDERS (OUTPATIENT)
Dept: ADMINISTRATIVE | Facility: HOSPITAL | Age: 48
End: 2023-08-23
Payer: COMMERCIAL

## 2023-08-23 ENCOUNTER — OFFICE VISIT (OUTPATIENT)
Dept: FAMILY MEDICINE CLINIC | Facility: CLINIC | Age: 48
End: 2023-08-23
Payer: COMMERCIAL

## 2023-08-23 ENCOUNTER — LAB (OUTPATIENT)
Dept: LAB | Facility: HOSPITAL | Age: 48
End: 2023-08-23
Payer: COMMERCIAL

## 2023-08-23 VITALS
HEART RATE: 68 BPM | WEIGHT: 183 LBS | DIASTOLIC BLOOD PRESSURE: 78 MMHG | OXYGEN SATURATION: 97 % | SYSTOLIC BLOOD PRESSURE: 124 MMHG | HEIGHT: 63 IN | BODY MASS INDEX: 32.43 KG/M2

## 2023-08-23 DIAGNOSIS — R31.9 HEMATURIA, UNSPECIFIED TYPE: Primary | ICD-10-CM

## 2023-08-23 DIAGNOSIS — E03.9 HYPOTHYROIDISM, UNSPECIFIED TYPE: Chronic | ICD-10-CM

## 2023-08-23 DIAGNOSIS — Z52.4 KIDNEY DONOR: Primary | ICD-10-CM

## 2023-08-23 DIAGNOSIS — Z52.4 KIDNEY DONOR: ICD-10-CM

## 2023-08-23 LAB
BACTERIA UR QL AUTO: ABNORMAL /HPF
BILIRUB UR QL STRIP: NEGATIVE
CLARITY UR: CLEAR
COLOR UR: YELLOW
GLUCOSE UR STRIP-MCNC: NEGATIVE MG/DL
HGB UR QL STRIP.AUTO: NEGATIVE
HYALINE CASTS UR QL AUTO: ABNORMAL /LPF
KETONES UR QL STRIP: NEGATIVE
LEUKOCYTE ESTERASE UR QL STRIP.AUTO: NEGATIVE
NITRITE UR QL STRIP: NEGATIVE
PH UR STRIP.AUTO: 7 [PH] (ref 5–8)
PROT UR QL STRIP: ABNORMAL
RBC # UR STRIP: ABNORMAL /HPF
REF LAB TEST METHOD: ABNORMAL
SP GR UR STRIP: 1.02 (ref 1–1.03)
SQUAMOUS #/AREA URNS HPF: ABNORMAL /HPF
UROBILINOGEN UR QL STRIP: ABNORMAL
WBC # UR STRIP: ABNORMAL /HPF

## 2023-08-23 PROCEDURE — 99213 OFFICE O/P EST LOW 20 MIN: CPT | Performed by: NURSE PRACTITIONER

## 2023-08-23 PROCEDURE — 81001 URINALYSIS AUTO W/SCOPE: CPT

## 2023-08-23 RX ORDER — LEVOTHYROXINE SODIUM 0.1 MG/1
100 TABLET ORAL DAILY
Qty: 90 TABLET | Refills: 1 | Status: SHIPPED | OUTPATIENT
Start: 2023-08-23

## 2023-08-23 NOTE — ASSESSMENT & PLAN NOTE
Last labs showing replacement, we will send in increased dose of levothyroxine 100 mcg today, order placed for patient to recheck labs in 6 weeks

## 2023-08-28 ENCOUNTER — TELEPHONE (OUTPATIENT)
Dept: FAMILY MEDICINE CLINIC | Facility: CLINIC | Age: 48
End: 2023-08-28
Payer: COMMERCIAL

## 2023-08-28 DIAGNOSIS — Z12.31 SCREENING MAMMOGRAM FOR BREAST CANCER: Primary | ICD-10-CM

## 2023-08-28 NOTE — TELEPHONE ENCOUNTER
Caller: Sharon Lagunas    Relationship: Self    Best call back number: [phone]    What orders are you requesting (i.e. lab or imaging): MAMMOGRAM    In what timeframe would the patient need to come in: ON OR BEFORE 09/25/2023    Where will you receive your lab/imaging services: [facility]    Additional notes: PATIENT IS NEEDING THIS ON OR BEFORE 09/25/2023

## 2023-10-12 ENCOUNTER — HOSPITAL ENCOUNTER (OUTPATIENT)
Dept: MAMMOGRAPHY | Facility: HOSPITAL | Age: 48
Discharge: HOME | End: 2023-10-12
Payer: COMMERCIAL

## 2023-10-12 ENCOUNTER — LAB (OUTPATIENT)
Dept: LAB | Facility: HOSPITAL | Age: 48
End: 2023-10-12
Payer: COMMERCIAL

## 2023-10-12 DIAGNOSIS — E03.9 HYPOTHYROIDISM, UNSPECIFIED TYPE: Chronic | ICD-10-CM

## 2023-10-12 DIAGNOSIS — Z12.31 SCREENING MAMMOGRAM FOR BREAST CANCER: ICD-10-CM

## 2023-10-12 DIAGNOSIS — R92.8 ABNORMAL SCREENING MAMMOGRAM: Primary | ICD-10-CM

## 2023-10-12 LAB
T4 FREE SERPL-MCNC: 0.97 NG/DL (ref 0.93–1.7)
TSH SERPL DL<=0.05 MIU/L-ACNC: 10.1 UIU/ML (ref 0.27–4.2)

## 2023-10-12 PROCEDURE — 77063 BREAST TOMOSYNTHESIS BI: CPT

## 2023-10-12 PROCEDURE — 84439 ASSAY OF FREE THYROXINE: CPT

## 2023-10-12 PROCEDURE — 36415 COLL VENOUS BLD VENIPUNCTURE: CPT

## 2023-10-12 PROCEDURE — 84443 ASSAY THYROID STIM HORMONE: CPT

## 2023-10-12 PROCEDURE — 77067 SCR MAMMO BI INCL CAD: CPT

## 2023-10-13 ENCOUNTER — HOSPITAL ENCOUNTER (OUTPATIENT)
Dept: MAMMOGRAPHY | Facility: HOSPITAL | Age: 48
Discharge: HOME | End: 2023-10-13
Payer: COMMERCIAL

## 2023-10-13 ENCOUNTER — HOSPITAL ENCOUNTER (OUTPATIENT)
Dept: ULTRASOUND IMAGING | Facility: HOSPITAL | Age: 48
Discharge: HOME | End: 2023-10-13
Payer: COMMERCIAL

## 2023-10-13 ENCOUNTER — APPOINTMENT (OUTPATIENT)
Dept: ULTRASOUND IMAGING | Facility: HOSPITAL | Age: 48
End: 2023-10-13
Payer: COMMERCIAL

## 2023-10-13 DIAGNOSIS — R92.8 ABNORMAL SCREENING MAMMOGRAM: ICD-10-CM

## 2023-10-13 PROCEDURE — G0279 TOMOSYNTHESIS, MAMMO: HCPCS

## 2023-10-13 PROCEDURE — 76642 ULTRASOUND BREAST LIMITED: CPT

## 2023-10-13 PROCEDURE — 77065 DX MAMMO INCL CAD UNI: CPT

## 2023-10-16 ENCOUNTER — TELEPHONE (OUTPATIENT)
Dept: FAMILY MEDICINE CLINIC | Facility: CLINIC | Age: 48
End: 2023-10-16
Payer: COMMERCIAL

## 2023-10-16 DIAGNOSIS — E03.9 HYPOTHYROIDISM, UNSPECIFIED TYPE: Chronic | ICD-10-CM

## 2023-10-16 RX ORDER — LEVOTHYROXINE SODIUM 88 UG/1
88 TABLET ORAL DAILY
Qty: 30 TABLET | Refills: 1 | Status: SHIPPED | OUTPATIENT
Start: 2023-10-16

## 2023-10-16 NOTE — TELEPHONE ENCOUNTER
PT WANTED TO MAKE YOU AWARE AS AN FYI THAT SHE IS HAVING SURGERY TOMORROW SHE IS DONATING A KIDNEY TO HER BROTHER.

## 2023-10-19 ENCOUNTER — READMISSION MANAGEMENT (OUTPATIENT)
Dept: CALL CENTER | Facility: HOSPITAL | Age: 48
End: 2023-10-19
Payer: COMMERCIAL

## 2023-10-19 NOTE — OUTREACH NOTE
Prep Survey      Flowsheet Row Responses   [facility] facility patient discharged from? Non-BH   Is LACE score < 7 ? Non-BH Discharge   Eligibility [facility]   Date of Admission 10/17/23   Date of Discharge 10/18/23   Discharge Disposition Home or Self Care   Discharge diagnosis ROBOTIC ASSISTED DONOR NEPHRECTOMY   Does the patient have one of the following disease processes/diagnoses(primary or secondary)? General Surgery   Prep survey completed? Yes            Merary Junior Registered Nurse

## 2023-10-20 ENCOUNTER — TRANSITIONAL CARE MANAGEMENT TELEPHONE ENCOUNTER (OUTPATIENT)
Dept: CALL CENTER | Facility: HOSPITAL | Age: 48
End: 2023-10-20
Payer: COMMERCIAL

## 2023-10-20 NOTE — OUTREACH NOTE
Call Center TCM Note      Flowsheet Row Responses   [facility] patient discharged from? Non-  [[facility]]   Does the patient have one of the following disease processes/diagnoses(primary or secondary)? Other   TCM attempt successful? Yes   Call start time 1111   Call end time 1114   Discharge diagnosis ROBOTIC ASSISTED DONOR NEPHRECTOMY   Meds reviewed with patient/caregiver? Yes   Is the patient having any side effects they believe may be caused by any medication additions or changes? No   Does the patient have all medications ordered at discharge? Yes   Is the patient taking all medications as directed (includes completed medication regime)? Yes   Comments HOSP DC FU appt 10/31/23 930 am.   Does the patient have an appointment with their PCP within 7-14 days of discharge? Yes   Has home health visited the patient within 72 hours of discharge? N/A   Psychosocial issues? No   Did the patient receive a copy of their discharge instructions? Yes   Nursing interventions Reviewed instructions with patient   What is the patient's perception of their health status since discharge? Improving   Is the patient/caregiver able to teach back signs and symptoms related to disease process for when to call PCP? Yes   Is the patient/caregiver able to teach back signs and symptoms related to disease process for when to call 911? Yes   Is the patient/caregiver able to teach back the hierarchy of who to call/visit for symptoms/problems? PCP, Specialist, Home health nurse, Urgent Care, ED, 911 Yes   TCM call completed? Yes   Wrap up additional comments Pt reports she is doing ok. Pt did have BM this am. Reviewed restrictions and s/s of infection to monitor for.   Call end time 1114            Margaret Quintanilla RN    10/20/2023, 11:15 EDT

## 2024-09-12 ENCOUNTER — OFFICE VISIT (OUTPATIENT)
Dept: FAMILY MEDICINE CLINIC | Facility: CLINIC | Age: 49
End: 2024-09-12
Payer: COMMERCIAL

## 2024-09-12 VITALS
WEIGHT: 180 LBS | BODY MASS INDEX: 31.89 KG/M2 | SYSTOLIC BLOOD PRESSURE: 126 MMHG | TEMPERATURE: 97.9 F | HEART RATE: 91 BPM | DIASTOLIC BLOOD PRESSURE: 66 MMHG | OXYGEN SATURATION: 94 % | HEIGHT: 63 IN

## 2024-09-12 DIAGNOSIS — M54.50 ACUTE BILATERAL LOW BACK PAIN WITHOUT SCIATICA: ICD-10-CM

## 2024-09-12 DIAGNOSIS — R51.9 ACUTE NONINTRACTABLE HEADACHE, UNSPECIFIED HEADACHE TYPE: ICD-10-CM

## 2024-09-12 DIAGNOSIS — U07.1 COVID-19: Primary | ICD-10-CM

## 2024-09-12 DIAGNOSIS — E03.9 HYPOTHYROIDISM, UNSPECIFIED TYPE: ICD-10-CM

## 2024-09-12 DIAGNOSIS — Z13.220 SCREENING FOR LIPID DISORDERS: ICD-10-CM

## 2024-09-12 DIAGNOSIS — R09.81 CONGESTION OF NASAL SINUS: ICD-10-CM

## 2024-09-12 DIAGNOSIS — Z13.1 SCREENING FOR DIABETES MELLITUS: ICD-10-CM

## 2024-09-12 LAB
EXPIRATION DATE: ABNORMAL
FLUAV AG UPPER RESP QL IA.RAPID: NOT DETECTED
FLUBV AG UPPER RESP QL IA.RAPID: NOT DETECTED
INTERNAL CONTROL: ABNORMAL
Lab: ABNORMAL
SARS-COV-2 AG UPPER RESP QL IA.RAPID: DETECTED

## 2024-09-12 RX ORDER — KETOROLAC TROMETHAMINE 30 MG/ML
30 INJECTION, SOLUTION INTRAMUSCULAR; INTRAVENOUS EVERY 6 HOURS PRN
Status: SHIPPED | OUTPATIENT
Start: 2024-09-12 | End: 2024-09-17

## 2024-09-12 RX ADMIN — KETOROLAC TROMETHAMINE 30 MG: 30 INJECTION, SOLUTION INTRAMUSCULAR; INTRAVENOUS at 10:16

## 2024-09-12 NOTE — LETTER
September 12, 2024     Patient: Sharon Lagunas   YOB: 1975   Date of Visit: 9/12/2024       To Whom It May Concern:    It is my medical opinion that Sharon Lagunas may return to work on 9/16/24. She is excused from 9/11/24-9/16/24.          Sincerely,        MICHELLE Rodriguez    CC: No Recipients

## 2024-09-12 NOTE — PROGRESS NOTES
Chief Complaint   Patient presents with    Headache    Back Pain    Fever     Pt has had sx since Monday night. Pt has been taking tylenol for relief.         Subjective     Sharon Lagunas  has a past medical history of Hypothyroid.    JOHNNIE Herrera is a 49-year-old female who is a patient of my colleague Trinidad Salmeron but today I am seeing her for a sick visit.    She presents with headache, back pain, and fever since Monday night. She has been taking tylenol for relief. Patient is covid positive in office today. Patient states her headache pain is a 10/10. Will give Toradol in office today for headache relief.     Hypothyroidism -patient asked if I could refill her Synthroid at 50 mcg.  Told her that she has not had a TSH drawn in almost a year and last TSH was 10.  We would need to redraw TSH before I could restart her Synthroid.    Allergies   Allergen Reactions    Penicillins Hives and Nausea And Vomiting         Current Outpatient Medications:     levothyroxine (Synthroid) 88 MCG tablet, Take 1 tablet by mouth Daily. (Patient not taking: Reported on 9/12/2024), Disp: 30 tablet, Rfl: 1    Nirmatrelvir & Ritonavir, 300mg/100mg, (PAXLOVID), Take 3 tablets by mouth 2 (Two) Times a Day for 5 days., Disp: 30 tablet, Rfl: 0    polyethylene glycol (MIRALAX) 17 g packet, Take as directed.  Instructions given in office.  Dispense: 238 g bottle (Patient not taking: Reported on 11/14/2022), Disp: 238 packet, Rfl: 0    Patient Active Problem List   Diagnosis    Smoker    Colon cancer screening declined    Laryngitis    Screening for colon cancer    Hypothyroidism        Past Surgical History:   Procedure Laterality Date    CHOLECYSTECTOMY      COLONOSCOPY N/A 11/14/2022    Procedure: COLONOSCOPY;  Surgeon: Darrin Jasmine MD;  Location: [facility];  Service: General;  Laterality: N/A;  NORMAL    TUBAL ABDOMINAL LIGATION         Social History     Socioeconomic History    Marital status:    Tobacco Use  "   Smoking status: Every Day     Current packs/day: 0.50     Average packs/day: 0.5 packs/day for 29.0 years (14.5 ttl pk-yrs)     Types: Cigarettes    Smokeless tobacco: Never   Vaping Use    Vaping status: Never Used   Substance and Sexual Activity    Alcohol use: Yes     Comment: rare    Drug use: Never    Sexual activity: Defer       Family History   Problem Relation Age of Onset    Cancer Father     Hypertension Father     Diabetes Father     Diabetes Brother     Hypertension Brother     Kidney disease Brother     Malig Hyperthermia Neg Hx        Family history, surgical history, past medical history, Allergies and med's reviewed with patient today and updated in [employer] EMR.     ROS:  Review of Systems   HENT:  Positive for congestion.    Respiratory: Negative.     Cardiovascular: Negative.    Gastrointestinal: Negative.    Musculoskeletal:  Positive for back pain.   Neurological:  Positive for headache.       OBJECTIVE:  Vitals:    09/12/24 0815   BP: 126/66   Pulse: 91   Temp: 97.9 °F (36.6 °C)   SpO2: 94%   Weight: 81.6 kg (180 lb)   Height: 160 cm (63\")     Body mass index is 31.89 kg/m².  No LMP recorded. (Menstrual status: Tubal ligation).    Physical Exam  Cardiovascular:      Rate and Rhythm: Normal rate and regular rhythm.      Pulses: Normal pulses.      Heart sounds: Normal heart sounds.   Pulmonary:      Effort: Pulmonary effort is normal.      Breath sounds: Normal breath sounds.   Neurological:      General: No focal deficit present.      Mental Status: She is oriented to person, place, and time.         BMI is >= 30 and <35. (Class 1 Obesity). The following options were offered after discussion;: exercise counseling/recommendations and nutrition counseling/recommendations      Health Maintenance Due   Topic Date Due    Pneumococcal Vaccine 0-64 (1 of 2 - PCV) Never done    TDAP/TD VACCINES (1 - Tdap) Never done    ANNUAL PHYSICAL  Never done    BMI FOLLOWUP  10/20/2023    COVID-19 Vaccine (1 - " 2023-24 season) Never done    INFLUENZA VACCINE  08/01/2024        Office Visit on 09/12/2024   Component Date Value Ref Range Status    SARS Antigen 09/12/2024 Detected (A)  Not Detected, Presumptive Negative Final    Influenza A Antigen WENCESLAO 09/12/2024 Not Detected  Not Detected Final    Influenza B Antigen WENCESLAO 09/12/2024 Not Detected  Not Detected Final    Internal Control 09/12/2024 Passed  Passed Final    Lot Number 09/12/2024 [id number]   Final    Expiration Date 09/12/2024 10/23/2025   Final     Your COVID-19 test result is positive.  You need to quarantine yourself until 3 things have happened:     1 at least 5 days of passenger symptoms first appeared  2. AND you have had no fever for at least 24 hours   3. AND other symptoms have improved (coughing, shortness of breath, etc.     If you develop emergency warning signs for COVID-19, get attention immediately.  Emergency warning signs include:  Severe trouble breathing  Persistent pain or pressure in the chest  If confusion or inability to wake  Bluish lips or face  *This list is not all inclusive    The CDC has guidance on COVID and other details.    ASSESSMENT/ PLAN:    Diagnoses and all orders for this visit:    1. COVID-19 (Primary)  -     Nirmatrelvir & Ritonavir, 300mg/100mg, (PAXLOVID); Take 3 tablets by mouth 2 (Two) Times a Day for 5 days.  Dispense: 30 tablet; Refill: 0  -     CBC w AUTO Differential; Future  -     Comprehensive metabolic panel; Future    2. Congestion of nasal sinus  -     POCT SARS-CoV-2 Antigen WENCESLAO + Flu    3. Acute nonintractable headache, unspecified headache type    4. Acute bilateral low back pain without sciatica    5. Hypothyroidism, unspecified type  -     TSH Rfx On Abnormal To Free T4    6. Screening for diabetes mellitus  -     Hemoglobin A1c; Future    7. Screening for lipid disorders  -     Lipid panel; Future        Orders Placed Today:     New Medications Ordered This Visit   Medications    Nirmatrelvir & Ritonavir,  300mg/100mg, (PAXLOVID)     Sig: Take 3 tablets by mouth 2 (Two) Times a Day for 5 days.     Dispense:  30 tablet     Refill:  0     If unable to break a box, update sig with remaining and discarded doses and dispense full box.     Order Specific Question:   I have reviewed the patient's medication list prior to prescribing Paxlovid due to the risk of serious adverse reactions secondary to drug interactions. I will consult with pharmacy, if needed     Answer:   Patient's medication list reviewed        Management Plan:     An After Visit Summary was printed and given to the patient at discharge.    Follow-up: Return in 4 weeks (on 10/10/2024) for Next scheduled follow up.    Ruth uSggs, MICHELLE 9/12/2024 08:59 EDT  This note was electronically signed.

## 2024-09-18 ENCOUNTER — LAB (OUTPATIENT)
Dept: LAB | Facility: HOSPITAL | Age: 49
End: 2024-09-18
Payer: COMMERCIAL

## 2024-09-18 DIAGNOSIS — U07.1 COVID-19: ICD-10-CM

## 2024-09-18 DIAGNOSIS — Z13.1 SCREENING FOR DIABETES MELLITUS: ICD-10-CM

## 2024-09-18 DIAGNOSIS — Z13.220 SCREENING FOR LIPID DISORDERS: ICD-10-CM

## 2024-09-18 LAB
ALBUMIN SERPL-MCNC: 4.4 G/DL (ref 3.5–5.2)
ALBUMIN/GLOB SERPL: 1.4 G/DL
ALP SERPL-CCNC: 126 U/L (ref 39–117)
ALT SERPL W P-5'-P-CCNC: 44 U/L (ref 1–33)
ANION GAP SERPL CALCULATED.3IONS-SCNC: 8 MMOL/L (ref 5–15)
AST SERPL-CCNC: 33 U/L (ref 1–32)
BASOPHILS # BLD AUTO: 0.04 10*3/MM3 (ref 0–0.2)
BASOPHILS NFR BLD AUTO: 0.5 % (ref 0–1.5)
BILIRUB SERPL-MCNC: 0.3 MG/DL (ref 0–1.2)
BUN SERPL-MCNC: 14 MG/DL (ref 6–20)
BUN/CREAT SERPL: 12.8 (ref 7–25)
CALCIUM SPEC-SCNC: 10.2 MG/DL (ref 8.6–10.5)
CHLORIDE SERPL-SCNC: 104 MMOL/L (ref 98–107)
CHOLEST SERPL-MCNC: 200 MG/DL (ref 0–200)
CO2 SERPL-SCNC: 27 MMOL/L (ref 22–29)
CREAT SERPL-MCNC: 1.09 MG/DL (ref 0.57–1)
DEPRECATED RDW RBC AUTO: 38.5 FL (ref 37–54)
EGFRCR SERPLBLD CKD-EPI 2021: 63.2 ML/MIN/1.73
EOSINOPHIL # BLD AUTO: 0.06 10*3/MM3 (ref 0–0.4)
EOSINOPHIL NFR BLD AUTO: 0.8 % (ref 0.3–6.2)
ERYTHROCYTE [DISTWIDTH] IN BLOOD BY AUTOMATED COUNT: 12.3 % (ref 12.3–15.4)
GLOBULIN UR ELPH-MCNC: 3.2 GM/DL
GLUCOSE SERPL-MCNC: 96 MG/DL (ref 65–99)
HBA1C MFR BLD: 5.7 % (ref 4.8–5.6)
HCT VFR BLD AUTO: 47 % (ref 34–46.6)
HDLC SERPL-MCNC: 31 MG/DL (ref 40–60)
HGB BLD-MCNC: 15.7 G/DL (ref 12–15.9)
IMM GRANULOCYTES # BLD AUTO: 0.03 10*3/MM3 (ref 0–0.05)
IMM GRANULOCYTES NFR BLD AUTO: 0.4 % (ref 0–0.5)
LDLC SERPL CALC-MCNC: 137 MG/DL (ref 0–100)
LDLC/HDLC SERPL: 4.33 {RATIO}
LYMPHOCYTES # BLD AUTO: 2.48 10*3/MM3 (ref 0.7–3.1)
LYMPHOCYTES NFR BLD AUTO: 32.4 % (ref 19.6–45.3)
MCH RBC QN AUTO: 29 PG (ref 26.6–33)
MCHC RBC AUTO-ENTMCNC: 33.4 G/DL (ref 31.5–35.7)
MCV RBC AUTO: 86.9 FL (ref 79–97)
MONOCYTES # BLD AUTO: 0.4 10*3/MM3 (ref 0.1–0.9)
MONOCYTES NFR BLD AUTO: 5.2 % (ref 5–12)
NEUTROPHILS NFR BLD AUTO: 4.64 10*3/MM3 (ref 1.7–7)
NEUTROPHILS NFR BLD AUTO: 60.7 % (ref 42.7–76)
NRBC BLD AUTO-RTO: 0 /100 WBC (ref 0–0.2)
PLATELET # BLD AUTO: 407 10*3/MM3 (ref 140–450)
PMV BLD AUTO: 10.6 FL (ref 6–12)
POTASSIUM SERPL-SCNC: 5.1 MMOL/L (ref 3.5–5.2)
PROT SERPL-MCNC: 7.6 G/DL (ref 6–8.5)
RBC # BLD AUTO: 5.41 10*6/MM3 (ref 3.77–5.28)
SODIUM SERPL-SCNC: 139 MMOL/L (ref 136–145)
T4 FREE SERPL-MCNC: 1.04 NG/DL (ref 0.92–1.68)
TRIGL SERPL-MCNC: 174 MG/DL (ref 0–150)
TSH SERPL DL<=0.05 MIU/L-ACNC: 6.3 UIU/ML (ref 0.27–4.2)
VLDLC SERPL-MCNC: 32 MG/DL (ref 5–40)
WBC NRBC COR # BLD AUTO: 7.65 10*3/MM3 (ref 3.4–10.8)

## 2024-09-18 PROCEDURE — 80061 LIPID PANEL: CPT

## 2024-09-18 PROCEDURE — 83036 HEMOGLOBIN GLYCOSYLATED A1C: CPT

## 2024-09-18 PROCEDURE — 36415 COLL VENOUS BLD VENIPUNCTURE: CPT

## 2024-09-18 PROCEDURE — 84439 ASSAY OF FREE THYROXINE: CPT

## 2024-09-18 PROCEDURE — 80050 GENERAL HEALTH PANEL: CPT

## 2024-09-19 ENCOUNTER — TELEPHONE (OUTPATIENT)
Dept: FAMILY MEDICINE CLINIC | Facility: CLINIC | Age: 49
End: 2024-09-19
Payer: COMMERCIAL

## 2024-09-19 DIAGNOSIS — E03.8 SUBCLINICAL HYPOTHYROIDISM: Primary | ICD-10-CM

## 2024-09-19 DIAGNOSIS — R51.9 ACUTE NONINTRACTABLE HEADACHE, UNSPECIFIED HEADACHE TYPE: Primary | ICD-10-CM

## 2024-09-19 RX ORDER — LEVOTHYROXINE SODIUM 50 UG/1
50 TABLET ORAL DAILY
Qty: 30 TABLET | Refills: 1 | Status: SHIPPED | OUTPATIENT
Start: 2024-09-19

## 2024-09-19 RX ORDER — SUMATRIPTAN 50 MG/1
TABLET, FILM COATED ORAL
Qty: 9 TABLET | Refills: 0 | Status: SHIPPED | OUTPATIENT
Start: 2024-09-19 | End: 2024-09-20

## 2024-09-20 ENCOUNTER — TELEPHONE (OUTPATIENT)
Dept: FAMILY MEDICINE CLINIC | Facility: CLINIC | Age: 49
End: 2024-09-20

## 2024-09-20 ENCOUNTER — OFFICE VISIT (OUTPATIENT)
Dept: FAMILY MEDICINE CLINIC | Facility: CLINIC | Age: 49
End: 2024-09-20
Payer: COMMERCIAL

## 2024-09-20 VITALS
DIASTOLIC BLOOD PRESSURE: 79 MMHG | HEART RATE: 73 BPM | OXYGEN SATURATION: 97 % | RESPIRATION RATE: 16 BRPM | BODY MASS INDEX: 31.78 KG/M2 | SYSTOLIC BLOOD PRESSURE: 140 MMHG | WEIGHT: 179.4 LBS

## 2024-09-20 DIAGNOSIS — R11.0 NAUSEA: ICD-10-CM

## 2024-09-20 DIAGNOSIS — R51.9 ACUTE NONINTRACTABLE HEADACHE, UNSPECIFIED HEADACHE TYPE: ICD-10-CM

## 2024-09-20 DIAGNOSIS — G44.86 CERVICOGENIC HEADACHE: Primary | ICD-10-CM

## 2024-09-20 PROCEDURE — 99213 OFFICE O/P EST LOW 20 MIN: CPT

## 2024-09-20 PROCEDURE — 96372 THER/PROPH/DIAG INJ SC/IM: CPT

## 2024-09-20 RX ORDER — ONDANSETRON 4 MG/1
4 TABLET, FILM COATED ORAL EVERY 8 HOURS PRN
Qty: 30 TABLET | Refills: 0 | Status: SHIPPED | OUTPATIENT
Start: 2024-09-20

## 2024-09-20 RX ORDER — RIMEGEPANT SULFATE 75 MG/75MG
1 TABLET, ORALLY DISINTEGRATING ORAL DAILY PRN
Qty: 8 TABLET | Refills: 2 | Status: SHIPPED | OUTPATIENT
Start: 2024-09-20 | End: 2024-09-20 | Stop reason: CLARIF

## 2024-09-20 RX ORDER — KETOROLAC TROMETHAMINE 30 MG/ML
30 INJECTION, SOLUTION INTRAMUSCULAR; INTRAVENOUS ONCE
Status: COMPLETED | OUTPATIENT
Start: 2024-09-20 | End: 2024-09-20

## 2024-09-20 RX ORDER — SUMATRIPTAN 50 MG/1
TABLET, FILM COATED ORAL
Qty: 9 TABLET | Refills: 0 | Status: SHIPPED | OUTPATIENT
Start: 2024-09-20 | End: 2024-09-20

## 2024-09-20 RX ADMIN — KETOROLAC TROMETHAMINE 30 MG: 30 INJECTION, SOLUTION INTRAMUSCULAR; INTRAVENOUS at 07:32

## 2024-10-10 ENCOUNTER — OFFICE VISIT (OUTPATIENT)
Dept: FAMILY MEDICINE CLINIC | Facility: CLINIC | Age: 49
End: 2024-10-10
Payer: COMMERCIAL

## 2024-10-10 ENCOUNTER — TELEPHONE (OUTPATIENT)
Dept: FAMILY MEDICINE CLINIC | Facility: CLINIC | Age: 49
End: 2024-10-10

## 2024-10-10 VITALS
DIASTOLIC BLOOD PRESSURE: 86 MMHG | WEIGHT: 183 LBS | SYSTOLIC BLOOD PRESSURE: 136 MMHG | BODY MASS INDEX: 32.43 KG/M2 | HEART RATE: 80 BPM | HEIGHT: 63 IN | OXYGEN SATURATION: 99 %

## 2024-10-10 DIAGNOSIS — Z12.31 ENCOUNTER FOR SCREENING MAMMOGRAM FOR MALIGNANT NEOPLASM OF BREAST: ICD-10-CM

## 2024-10-10 DIAGNOSIS — Z00.00 ANNUAL PHYSICAL EXAM: ICD-10-CM

## 2024-10-10 DIAGNOSIS — E03.9 HYPOTHYROIDISM, UNSPECIFIED TYPE: ICD-10-CM

## 2024-10-10 DIAGNOSIS — G43.009 MIGRAINE WITHOUT AURA AND WITHOUT STATUS MIGRAINOSUS, NOT INTRACTABLE: ICD-10-CM

## 2024-10-10 DIAGNOSIS — Z76.89 ESTABLISHING CARE WITH NEW DOCTOR, ENCOUNTER FOR: Primary | ICD-10-CM

## 2024-10-10 PROCEDURE — 99396 PREV VISIT EST AGE 40-64: CPT

## 2024-10-10 RX ORDER — RIMEGEPANT SULFATE 75 MG/75MG
75 TABLET, ORALLY DISINTEGRATING ORAL AS NEEDED
COMMUNITY
Start: 2024-09-20

## 2024-10-10 RX ORDER — SUMATRIPTAN 50 MG/1
50 TABLET, FILM COATED ORAL ONCE
COMMUNITY
Start: 2024-09-20

## 2024-10-10 NOTE — PROGRESS NOTES
"Chief Complaint    Annual Exam  Follow-up and Migraine (/)    Subjective          Sharon Lagunas is a 49 y.o. female who presents to [facility]    Annual Exam    Hypothyroidism - Is taking Levothyroxine 50mcg. Reports no side effects. Has missed a couple of doses of her medication and says she feels better when she does take it.    Migraine - States that the Nurtec is \"like gold\". Hasn't had a migraine since COVID last month she mainly just feels some pressure. States her symptoms are much relieved.    Donated a kidney to her brother. States she wants to stay on top of testing for diabetes and and high blood pressure.     Will place order for mammogram as she is almost due for hers.     No other concerns at this time. All care gaps addressed.      Review of Systems   Respiratory: Negative.     Cardiovascular: Negative.    Gastrointestinal: Negative.    Genitourinary: Negative.    Neurological: Negative.    Psychiatric/Behavioral: Negative.            Medical History: has a past medical history of Hypothyroid.     Surgical History: has a past surgical history that includes Tubal ligation; Cholecystectomy; and Colonoscopy (N/A, 11/14/2022).     Family History: family history includes Cancer in her father; Diabetes in her brother and father; Hypertension in her brother and father; Kidney disease in her brother.     Social History: reports that she has been smoking cigarettes. She has a 14.5 pack-year smoking history. She has never used smokeless tobacco. She reports current alcohol use. She reports that she does not use drugs.    Allergies: Penicillins      Health Maintenance Due   Topic Date Due    ANNUAL PHYSICAL  Never done            Current Outpatient Medications:     levothyroxine (Synthroid) 50 MCG tablet, Take 1 tablet by mouth Daily., Disp: 30 tablet, Rfl: 1    Nurtec 75 MG dispersible tablet, Take 1 tablet by mouth As Needed (migranes)., Disp: , Rfl:     ondansetron (ZOFRAN) 4 " "MG tablet, Take 1 tablet by mouth Every 8 (Eight) Hours As Needed for Nausea or Vomiting., Disp: 30 tablet, Rfl: 0    SUMAtriptan (IMITREX) 50 MG tablet, Take 1 tablet by mouth 1 (One) Time., Disp: , Rfl:         There is no immunization history on file for this patient.      Objective       Vitals:    10/10/24 0702   BP: 136/86   Pulse: 80   SpO2: 99%   Weight: 83 kg (183 lb)   Height: 160 cm (63\")      Body mass index is 32.42 kg/m².   Wt Readings from Last 3 Encounters:   10/10/24 83 kg (183 lb)   09/20/24 81.4 kg (179 lb 6.4 oz)   09/12/24 81.6 kg (180 lb)      BP Readings from Last 3 Encounters:   10/10/24 136/86   09/20/24 140/79   09/12/24 126/66                 Physical Exam  Cardiovascular:      Rate and Rhythm: Normal rate and regular rhythm.      Pulses: Normal pulses.      Heart sounds: Normal heart sounds.   Pulmonary:      Effort: Pulmonary effort is normal.      Breath sounds: Normal breath sounds.   Abdominal:      General: Abdomen is flat.      Palpations: Abdomen is soft.             Result Review :       Common labs          10/18/2023    06:15 9/18/2024    10:01   Common Labs   Glucose  96    Glucose 91        BUN 11     14    Creatinine 1.23     1.09    Sodium 139     139    Potassium 3.8     5.1    Chloride 104     104    Calcium 9.0     10.2    Albumin  4.4    Total Bilirubin  0.3    Alkaline Phosphatase  126    AST (SGOT)  33    ALT (SGPT)  44    WBC 20.7     7.65    Hemoglobin 12.7     15.7    Hematocrit 37.7     47.0    Platelets 431     407    Total Cholesterol  200    Triglycerides  174    HDL Cholesterol  31    LDL Cholesterol   137    Hemoglobin A1C  5.70       Details          This result is from an external source.                              Assessment and Plan          Diagnoses and all orders for this visit:    1. Establishing care with new doctor, encounter for (Primary)  Comments:  Switched providers.    2. Annual physical exam    3. Encounter for screening mammogram for " malignant neoplasm of breast  -     Mammo Screening Digital Tomosynthesis Bilateral With CAD; Future    4. Hypothyroidism, unspecified type  Comments:  Continue taking Levothyroxine. Get TSH at end of the month.    5. Migraine without aura and without status migrainosus, not intractable  Comments:  Continue taking Nurtec as needed.          Follow Up     Return in about 6 months (around 4/10/2025) for Next scheduled follow up.    Updated annual wellness visit checklist.  Immunizations discussed.  Screening discussed and/or ordered.  Recommend yearly dental and eye exams. Also discussed monitoring of blood pressure and lipids.      Patient was given instructions and counseling regarding her condition or for health maintenance advice. Please see specific information pulled into the AVS if appropriate.         MICHELLE Rodriguez

## 2024-10-10 NOTE — TELEPHONE ENCOUNTER
Messaged patient about contacting billing for her questions she had at her appointment this morning about getting labs paid for by the [facility]. Phone number given for billing.

## 2024-10-10 NOTE — PROGRESS NOTES
"Chief Complaint   Patient presents with    Follow-up    Migraine               Subjective     Sharon Lagunas  has a past medical history of Hypothyroid.    HPI    Presents today for follow up on migraine and hypothyroidism. Does well on her medication. She does miss a couple doses. States she does feel better when she takes it.     Migraine: States that the Nurtec is \"like gold\". States that she mainly gets     Inquiring about her kidney transplant. Watch for htn, dm, and creatinine.      Allergies   Allergen Reactions    Penicillins Hives and Nausea And Vomiting         Current Outpatient Medications:     levothyroxine (Synthroid) 50 MCG tablet, Take 1 tablet by mouth Daily., Disp: 30 tablet, Rfl: 1    Nurtec 75 MG dispersible tablet, Take 1 tablet by mouth As Needed (migranes)., Disp: , Rfl:     ondansetron (ZOFRAN) 4 MG tablet, Take 1 tablet by mouth Every 8 (Eight) Hours As Needed for Nausea or Vomiting., Disp: 30 tablet, Rfl: 0    SUMAtriptan (IMITREX) 50 MG tablet, Take 1 tablet by mouth 1 (One) Time., Disp: , Rfl:     Patient Active Problem List   Diagnosis    Smoker    Colon cancer screening declined    Laryngitis    Screening for colon cancer    Hypothyroidism        Past Surgical History:   Procedure Laterality Date    CHOLECYSTECTOMY      COLONOSCOPY N/A 11/14/2022    Procedure: COLONOSCOPY;  Surgeon: Darrin Jasmine MD;  Location: [facility] ENDOSCOPY;  Service: General;  Laterality: N/A;  NORMAL    TUBAL ABDOMINAL LIGATION         Social History     Socioeconomic History    Marital status:    Tobacco Use    Smoking status: Every Day     Current packs/day: 0.50     Average packs/day: 0.5 packs/day for 29.0 years (14.5 ttl pk-yrs)     Types: Cigarettes    Smokeless tobacco: Never   Vaping Use    Vaping status: Never Used   Substance and Sexual Activity    Alcohol use: Yes     Comment: rare    Drug use: Never    Sexual activity: Defer       Family History   Problem Relation Age of Onset    Cancer " "Father     Hypertension Father     Diabetes Father     Diabetes Brother     Hypertension Brother     Kidney disease Brother     Malig Hyperthermia Neg Hx        Family history, surgical history, past medical history, Allergies and med's reviewed with patient today and updated in [employer] EMR.     ROS:  Review of Systems    OBJECTIVE:  Vitals:    10/10/24 0702   BP: 136/86   Pulse: 80   SpO2: 99%   Weight: 83 kg (183 lb)   Height: 160 cm (63\")     Body mass index is 32.42 kg/m².  No LMP recorded. (Menstrual status: Tubal ligation).    Physical Exam           Health Maintenance Due   Topic Date Due    ANNUAL PHYSICAL  Never done        Lab on 09/18/2024   Component Date Value Ref Range Status    Glucose 09/18/2024 96  65 - 99 mg/dL Final    BUN 09/18/2024 14  6 - 20 mg/dL Final    Creatinine 09/18/2024 1.09 (H)  0.57 - 1.00 mg/dL Final    Sodium 09/18/2024 139  136 - 145 mmol/L Final    Potassium 09/18/2024 5.1  3.5 - 5.2 mmol/L Final    Chloride 09/18/2024 104  98 - 107 mmol/L Final    CO2 09/18/2024 27.0  22.0 - 29.0 mmol/L Final    Calcium 09/18/2024 10.2  8.6 - 10.5 mg/dL Final    Total Protein 09/18/2024 7.6  6.0 - 8.5 g/dL Final    Albumin 09/18/2024 4.4  3.5 - 5.2 g/dL Final    ALT (SGPT) 09/18/2024 44 (H)  1 - 33 U/L Final    AST (SGOT) 09/18/2024 33 (H)  1 - 32 U/L Final    Alkaline Phosphatase 09/18/2024 126 (H)  39 - 117 U/L Final    Total Bilirubin 09/18/2024 0.3  0.0 - 1.2 mg/dL Final    Globulin 09/18/2024 3.2  gm/dL Final    A/G Ratio 09/18/2024 1.4  g/dL Final    BUN/Creatinine Ratio 09/18/2024 12.8  7.0 - 25.0 Final    Anion Gap 09/18/2024 8.0  5.0 - 15.0 mmol/L Final    eGFR 09/18/2024 63.2  >60.0 mL/min/1.73 Final    Total Cholesterol 09/18/2024 200  0 - 200 mg/dL Final    Triglycerides 09/18/2024 174 (H)  0 - 150 mg/dL Final    HDL Cholesterol 09/18/2024 31 (L)  40 - 60 mg/dL Final    LDL Cholesterol  09/18/2024 137 (H)  0 - 100 mg/dL Final    VLDL Cholesterol 09/18/2024 32  5 - 40 mg/dL Final    " LDL/HDL Ratio 09/18/2024 4.33   Final    Hemoglobin A1C 09/18/2024 5.70 (H)  4.80 - 5.60 % Final    WBC 09/18/2024 7.65  3.40 - 10.80 10*3/mm3 Final    RBC 09/18/2024 5.41 (H)  3.77 - 5.28 10*6/mm3 Final    Hemoglobin 09/18/2024 15.7  12.0 - 15.9 g/dL Final    Hematocrit 09/18/2024 47.0 (H)  34.0 - 46.6 % Final    MCV 09/18/2024 86.9  79.0 - 97.0 fL Final    MCH 09/18/2024 29.0  26.6 - 33.0 pg Final    MCHC 09/18/2024 33.4  31.5 - 35.7 g/dL Final    RDW 09/18/2024 12.3  12.3 - 15.4 % Final    RDW-SD 09/18/2024 38.5  37.0 - 54.0 fl Final    MPV 09/18/2024 10.6  6.0 - 12.0 fL Final    Platelets 09/18/2024 407  140 - 450 10*3/mm3 Final    Neutrophil % 09/18/2024 60.7  42.7 - 76.0 % Final    Lymphocyte % 09/18/2024 32.4  19.6 - 45.3 % Final    Monocyte % 09/18/2024 5.2  5.0 - 12.0 % Final    Eosinophil % 09/18/2024 0.8  0.3 - 6.2 % Final    Basophil % 09/18/2024 0.5  0.0 - 1.5 % Final    Immature Grans % 09/18/2024 0.4  0.0 - 0.5 % Final    Neutrophils, Absolute 09/18/2024 4.64  1.70 - 7.00 10*3/mm3 Final    Lymphocytes, Absolute 09/18/2024 2.48  0.70 - 3.10 10*3/mm3 Final    Monocytes, Absolute 09/18/2024 0.40  0.10 - 0.90 10*3/mm3 Final    Eosinophils, Absolute 09/18/2024 0.06  0.00 - 0.40 10*3/mm3 Final    Basophils, Absolute 09/18/2024 0.04  0.00 - 0.20 10*3/mm3 Final    Immature Grans, Absolute 09/18/2024 0.03  0.00 - 0.05 10*3/mm3 Final    nRBC 09/18/2024 0.0  0.0 - 0.2 /100 WBC Final   Office Visit on 09/12/2024   Component Date Value Ref Range Status    SARS Antigen 09/12/2024 Detected (A)  Not Detected, Presumptive Negative Final    Influenza A Antigen WENCESLAO 09/12/2024 Not Detected  Not Detected Final    Influenza B Antigen WENCESLAO 09/12/2024 Not Detected  Not Detected Final    Internal Control 09/12/2024 Passed  Passed Final    Lot Number 09/12/2024 [id number]   Final    Expiration Date 09/12/2024 10/23/2025   Final    TSH 09/18/2024 6.300 (H)  0.270 - 4.200 uIU/mL Final    Free T4 09/18/2024 1.04  0.92 - 1.68 ng/dL  Final       ASSESSMENT/ PLAN:    There are no diagnoses linked to this encounter.    Orders Placed Today:     No orders of the defined types were placed in this encounter.       Management Plan:     An After Visit Summary was printed and given to the patient at discharge.    Follow-up: No follow-ups on file.    MICHELLE Rodriguez 10/10/2024 07:20 EDT  This note was electronically signed.

## 2024-10-31 ENCOUNTER — LAB (OUTPATIENT)
Dept: LAB | Facility: HOSPITAL | Age: 49
End: 2024-10-31
Payer: COMMERCIAL

## 2024-10-31 DIAGNOSIS — E03.8 SUBCLINICAL HYPOTHYROIDISM: ICD-10-CM

## 2024-10-31 LAB
T4 FREE SERPL-MCNC: 1.29 NG/DL (ref 0.92–1.68)
TSH SERPL DL<=0.05 MIU/L-ACNC: 9.6 UIU/ML (ref 0.27–4.2)

## 2024-10-31 PROCEDURE — 84443 ASSAY THYROID STIM HORMONE: CPT

## 2024-10-31 PROCEDURE — 84439 ASSAY OF FREE THYROXINE: CPT

## 2024-10-31 PROCEDURE — 36415 COLL VENOUS BLD VENIPUNCTURE: CPT

## 2024-11-01 DIAGNOSIS — E03.9 HYPOTHYROIDISM, UNSPECIFIED TYPE: Primary | ICD-10-CM

## 2025-01-16 DIAGNOSIS — E03.8 SUBCLINICAL HYPOTHYROIDISM: ICD-10-CM

## 2025-01-17 RX ORDER — LEVOTHYROXINE SODIUM 50 UG/1
50 TABLET ORAL DAILY
Qty: 30 TABLET | Refills: 0 | Status: SHIPPED | OUTPATIENT
Start: 2025-01-17

## 2025-03-17 DIAGNOSIS — E03.8 SUBCLINICAL HYPOTHYROIDISM: ICD-10-CM

## 2025-03-17 RX ORDER — LEVOTHYROXINE SODIUM 50 UG/1
50 TABLET ORAL DAILY
Qty: 30 TABLET | Refills: 0 | Status: SHIPPED | OUTPATIENT
Start: 2025-03-17

## 2025-06-10 DIAGNOSIS — E03.8 SUBCLINICAL HYPOTHYROIDISM: ICD-10-CM

## 2025-06-10 NOTE — TELEPHONE ENCOUNTER
Caller: Sharon Lagunas    Relationship: Self    Best call back number: [phone]    Requested Prescriptions:   Requested Prescriptions     Pending Prescriptions Disp Refills    levothyroxine (SYNTHROID, LEVOTHROID) 50 MCG tablet 30 tablet 0     Sig: Take 1 tablet by mouth Daily.        Pharmacy where request should be sent: [facility] [address] [place] [address] [phone] [facility] [phone]      Last office visit with prescribing clinician: 10/10/2024   Last telemedicine visit with prescribing clinician: Visit date not found   Next office visit with prescribing clinician: Visit date not found       Does the patient have less than a 3 day supply:  [x] Yes  [] No    Would you like a call back once the refill request has been completed: [] Yes [x] No    If the office needs to give you a call back, can they leave a voicemail: [] Yes [x] No    Luda Feliz   06/10/25 09:43 EDT

## 2025-06-11 RX ORDER — LEVOTHYROXINE SODIUM 50 UG/1
50 TABLET ORAL DAILY
Qty: 30 TABLET | Refills: 0 | Status: SHIPPED | OUTPATIENT
Start: 2025-06-11

## 2025-07-11 DIAGNOSIS — E03.8 SUBCLINICAL HYPOTHYROIDISM: ICD-10-CM

## 2025-07-11 RX ORDER — LEVOTHYROXINE SODIUM 50 UG/1
50 TABLET ORAL DAILY
Qty: 30 TABLET | Refills: 0 | Status: SHIPPED | OUTPATIENT
Start: 2025-07-11

## (undated) DEVICE — SOLIDIFIER LIQLOC PLS 1500CC BT

## (undated) DEVICE — STERILE POLYISOPRENE POWDER-FREE SURGICAL GLOVES: Brand: PROTEXIS

## (undated) DEVICE — GLV SURG SENSICARE PI LF PF 7.5 GRN STRL

## (undated) DEVICE — Device

## (undated) DEVICE — Device: Brand: DEFENDO AIR/WATER/SUCTION AND BIOPSY VALVE

## (undated) DEVICE — SOL IRRG H2O PL/BG 1000ML STRL

## (undated) DEVICE — SOL IRR H2O BTL 1000ML STRL

## (undated) DEVICE — LINER SURG CANSTR SXN S/RIGD 1500CC

## (undated) DEVICE — TUBING, SUCTION, 1/4" X 10', STRAIGHT: Brand: MEDLINE

## (undated) DEVICE — CONN JET HYDRA H20 AUXILIARY DISP